# Patient Record
Sex: FEMALE | Race: WHITE | NOT HISPANIC OR LATINO
[De-identification: names, ages, dates, MRNs, and addresses within clinical notes are randomized per-mention and may not be internally consistent; named-entity substitution may affect disease eponyms.]

---

## 2017-01-08 ENCOUNTER — FORM ENCOUNTER (OUTPATIENT)
Age: 42
End: 2017-01-08

## 2017-01-09 ENCOUNTER — APPOINTMENT (OUTPATIENT)
Dept: ULTRASOUND IMAGING | Facility: CLINIC | Age: 42
End: 2017-01-09

## 2017-01-09 ENCOUNTER — APPOINTMENT (OUTPATIENT)
Dept: MAMMOGRAPHY | Facility: CLINIC | Age: 42
End: 2017-01-09

## 2017-01-09 ENCOUNTER — OUTPATIENT (OUTPATIENT)
Dept: OUTPATIENT SERVICES | Facility: HOSPITAL | Age: 42
LOS: 1 days | End: 2017-01-09
Payer: COMMERCIAL

## 2017-01-09 DIAGNOSIS — Z00.8 ENCOUNTER FOR OTHER GENERAL EXAMINATION: ICD-10-CM

## 2017-01-09 PROCEDURE — 76642 ULTRASOUND BREAST LIMITED: CPT

## 2017-01-09 PROCEDURE — 77065 DX MAMMO INCL CAD UNI: CPT

## 2017-02-03 ENCOUNTER — MESSAGE (OUTPATIENT)
Age: 42
End: 2017-02-03

## 2017-07-10 ENCOUNTER — FORM ENCOUNTER (OUTPATIENT)
Age: 42
End: 2017-07-10

## 2017-07-11 ENCOUNTER — OUTPATIENT (OUTPATIENT)
Dept: OUTPATIENT SERVICES | Facility: HOSPITAL | Age: 42
LOS: 1 days | End: 2017-07-11
Payer: COMMERCIAL

## 2017-07-11 ENCOUNTER — APPOINTMENT (OUTPATIENT)
Dept: ULTRASOUND IMAGING | Facility: CLINIC | Age: 42
End: 2017-07-11

## 2017-07-11 DIAGNOSIS — Z00.8 ENCOUNTER FOR OTHER GENERAL EXAMINATION: ICD-10-CM

## 2017-07-11 PROCEDURE — 76642 ULTRASOUND BREAST LIMITED: CPT

## 2018-01-17 ENCOUNTER — FORM ENCOUNTER (OUTPATIENT)
Age: 43
End: 2018-01-17

## 2018-01-18 ENCOUNTER — APPOINTMENT (OUTPATIENT)
Dept: MAMMOGRAPHY | Facility: CLINIC | Age: 43
End: 2018-01-18
Payer: COMMERCIAL

## 2018-01-18 ENCOUNTER — APPOINTMENT (OUTPATIENT)
Dept: ULTRASOUND IMAGING | Facility: CLINIC | Age: 43
End: 2018-01-18
Payer: COMMERCIAL

## 2018-01-18 ENCOUNTER — OUTPATIENT (OUTPATIENT)
Dept: OUTPATIENT SERVICES | Facility: HOSPITAL | Age: 43
LOS: 1 days | End: 2018-01-18
Payer: COMMERCIAL

## 2018-01-18 DIAGNOSIS — Z00.8 ENCOUNTER FOR OTHER GENERAL EXAMINATION: ICD-10-CM

## 2018-01-18 PROCEDURE — 77065 DX MAMMO INCL CAD UNI: CPT | Mod: 26,GG

## 2018-01-18 PROCEDURE — 77067 SCR MAMMO BI INCL CAD: CPT | Mod: 26,59

## 2018-01-18 PROCEDURE — 77065 DX MAMMO INCL CAD UNI: CPT

## 2018-01-18 PROCEDURE — 77063 BREAST TOMOSYNTHESIS BI: CPT | Mod: 26,59

## 2018-01-18 PROCEDURE — 76642 ULTRASOUND BREAST LIMITED: CPT

## 2018-01-18 PROCEDURE — 76642 ULTRASOUND BREAST LIMITED: CPT | Mod: 26,RT

## 2018-01-18 PROCEDURE — 77067 SCR MAMMO BI INCL CAD: CPT

## 2018-01-18 PROCEDURE — 77063 BREAST TOMOSYNTHESIS BI: CPT

## 2018-02-26 ENCOUNTER — APPOINTMENT (OUTPATIENT)
Dept: OBGYN | Facility: CLINIC | Age: 43
End: 2018-02-26
Payer: COMMERCIAL

## 2018-02-26 VITALS
SYSTOLIC BLOOD PRESSURE: 110 MMHG | WEIGHT: 140 LBS | HEIGHT: 65 IN | DIASTOLIC BLOOD PRESSURE: 70 MMHG | BODY MASS INDEX: 23.32 KG/M2

## 2018-02-26 DIAGNOSIS — Z01.419 ENCOUNTER FOR GYNECOLOGICAL EXAMINATION (GENERAL) (ROUTINE) W/OUT ABNORMAL FINDINGS: ICD-10-CM

## 2018-02-26 PROCEDURE — 82270 OCCULT BLOOD FECES: CPT

## 2018-02-26 PROCEDURE — 99396 PREV VISIT EST AGE 40-64: CPT

## 2018-03-01 LAB
CYTOLOGY CVX/VAG DOC THIN PREP: NORMAL
HPV HIGH+LOW RISK DNA PNL CVX: NOT DETECTED

## 2020-01-14 ENCOUNTER — FORM ENCOUNTER (OUTPATIENT)
Age: 45
End: 2020-01-14

## 2020-01-15 ENCOUNTER — OUTPATIENT (OUTPATIENT)
Dept: OUTPATIENT SERVICES | Facility: HOSPITAL | Age: 45
LOS: 1 days | End: 2020-01-15
Payer: COMMERCIAL

## 2020-01-15 ENCOUNTER — APPOINTMENT (OUTPATIENT)
Dept: ULTRASOUND IMAGING | Facility: CLINIC | Age: 45
End: 2020-01-15
Payer: COMMERCIAL

## 2020-01-15 ENCOUNTER — APPOINTMENT (OUTPATIENT)
Dept: MAMMOGRAPHY | Facility: CLINIC | Age: 45
End: 2020-01-15
Payer: COMMERCIAL

## 2020-01-15 DIAGNOSIS — Z01.419 ENCOUNTER FOR GYNECOLOGICAL EXAMINATION (GENERAL) (ROUTINE) WITHOUT ABNORMAL FINDINGS: ICD-10-CM

## 2020-01-15 PROCEDURE — 77067 SCR MAMMO BI INCL CAD: CPT | Mod: 26

## 2020-01-15 PROCEDURE — 77063 BREAST TOMOSYNTHESIS BI: CPT | Mod: 26

## 2020-01-15 PROCEDURE — 77067 SCR MAMMO BI INCL CAD: CPT

## 2020-01-15 PROCEDURE — 77063 BREAST TOMOSYNTHESIS BI: CPT

## 2020-01-15 PROCEDURE — 76641 ULTRASOUND BREAST COMPLETE: CPT | Mod: 26,50

## 2020-01-15 PROCEDURE — 76641 ULTRASOUND BREAST COMPLETE: CPT

## 2020-02-24 ENCOUNTER — APPOINTMENT (OUTPATIENT)
Dept: OBGYN | Facility: CLINIC | Age: 45
End: 2020-02-24
Payer: COMMERCIAL

## 2020-02-24 VITALS
DIASTOLIC BLOOD PRESSURE: 60 MMHG | BODY MASS INDEX: 23.32 KG/M2 | HEIGHT: 65 IN | WEIGHT: 140 LBS | SYSTOLIC BLOOD PRESSURE: 100 MMHG

## 2020-02-24 DIAGNOSIS — Z01.419 ENCOUNTER FOR GYNECOLOGICAL EXAMINATION (GENERAL) (ROUTINE) W/OUT ABNORMAL FINDINGS: ICD-10-CM

## 2020-02-24 PROCEDURE — 99396 PREV VISIT EST AGE 40-64: CPT

## 2020-02-24 PROCEDURE — 82270 OCCULT BLOOD FECES: CPT

## 2020-02-24 NOTE — PHYSICAL EXAM
[Alert] : alert [Awake] : awake [Acute Distress] : no acute distress [Mass] : no breast mass [Axillary LAD] : no axillary lymphadenopathy [Soft] : soft [Nipple Discharge] : no nipple discharge [Oriented x3] : oriented to person, place, and time [Tender] : non tender [Normal] : uterus [Pap Obtained] : a Pap smear was performed [No Bleeding] : there was no active vaginal bleeding [Uterine Adnexae] : were not tender and not enlarged [No Tenderness] : no rectal tenderness [Occult Blood] : occult blood test from digital rectal exam was negative [Nl Sphincter Tone] : normal sphincter tone

## 2020-02-24 NOTE — CHIEF COMPLAINT
[FreeTextEntry1] : Patient is a 44-year-old female presents for routine annual gynecologic examination. Patient has no complaints. Patient's last mammogram was one month ago [Annual Visit] : annual visit

## 2020-02-24 NOTE — PHYSICAL EXAM
[Awake] : awake [Alert] : alert [Acute Distress] : no acute distress [Mass] : no breast mass [Soft] : soft [Axillary LAD] : no axillary lymphadenopathy [Nipple Discharge] : no nipple discharge [Tender] : non tender [Oriented x3] : oriented to person, place, and time [Normal] : uterus [Pap Obtained] : a Pap smear was performed [No Bleeding] : there was no active vaginal bleeding [No Tenderness] : no rectal tenderness [Uterine Adnexae] : were not tender and not enlarged [Occult Blood] : occult blood test from digital rectal exam was negative [Nl Sphincter Tone] : normal sphincter tone

## 2020-02-25 LAB — HPV HIGH+LOW RISK DNA PNL CVX: NOT DETECTED

## 2021-04-08 ENCOUNTER — OUTPATIENT (OUTPATIENT)
Dept: OUTPATIENT SERVICES | Facility: HOSPITAL | Age: 46
LOS: 1 days | End: 2021-04-08
Payer: COMMERCIAL

## 2021-04-08 ENCOUNTER — RESULT REVIEW (OUTPATIENT)
Age: 46
End: 2021-04-08

## 2021-04-08 ENCOUNTER — APPOINTMENT (OUTPATIENT)
Dept: MAMMOGRAPHY | Facility: CLINIC | Age: 46
End: 2021-04-08
Payer: COMMERCIAL

## 2021-04-08 ENCOUNTER — APPOINTMENT (OUTPATIENT)
Dept: ULTRASOUND IMAGING | Facility: CLINIC | Age: 46
End: 2021-04-08
Payer: COMMERCIAL

## 2021-04-08 DIAGNOSIS — Z12.39 ENCOUNTER FOR OTHER SCREENING FOR MALIGNANT NEOPLASM OF BREAST: ICD-10-CM

## 2021-04-08 PROCEDURE — 77067 SCR MAMMO BI INCL CAD: CPT | Mod: 26

## 2021-04-08 PROCEDURE — 76641 ULTRASOUND BREAST COMPLETE: CPT | Mod: 26,50

## 2021-04-08 PROCEDURE — 77063 BREAST TOMOSYNTHESIS BI: CPT

## 2021-04-08 PROCEDURE — 77067 SCR MAMMO BI INCL CAD: CPT

## 2021-04-08 PROCEDURE — 77063 BREAST TOMOSYNTHESIS BI: CPT | Mod: 26

## 2021-04-08 PROCEDURE — 76641 ULTRASOUND BREAST COMPLETE: CPT

## 2021-06-09 ENCOUNTER — APPOINTMENT (OUTPATIENT)
Dept: OBGYN | Facility: CLINIC | Age: 46
End: 2021-06-09
Payer: COMMERCIAL

## 2021-06-09 VITALS
HEIGHT: 65 IN | DIASTOLIC BLOOD PRESSURE: 66 MMHG | WEIGHT: 140 LBS | SYSTOLIC BLOOD PRESSURE: 106 MMHG | HEART RATE: 70 BPM | BODY MASS INDEX: 23.32 KG/M2 | TEMPERATURE: 96.7 F | RESPIRATION RATE: 16 BRPM

## 2021-06-09 DIAGNOSIS — N95.1 MENOPAUSAL AND FEMALE CLIMACTERIC STATES: ICD-10-CM

## 2021-06-09 DIAGNOSIS — Z01.419 ENCOUNTER FOR GYNECOLOGICAL EXAMINATION (GENERAL) (ROUTINE) W/OUT ABNORMAL FINDINGS: ICD-10-CM

## 2021-06-09 PROCEDURE — 82270 OCCULT BLOOD FECES: CPT

## 2021-06-09 PROCEDURE — 99396 PREV VISIT EST AGE 40-64: CPT

## 2021-06-09 PROCEDURE — 99072 ADDL SUPL MATRL&STAF TM PHE: CPT

## 2021-06-09 NOTE — HISTORY OF PRESENT ILLNESS
[FreeTextEntry1] : Patient is a 46-year-old female presents for routine annual gynecologic examination. Patient states her cycles are becoming irregular last menstrual period March 2021. Patient has occasional vasomotor symptoms. Discussed perimenopausal patient in detail. Advised obtained FSH and TSH. Discuss natural supplements. Patient's last mammogram was April 2021.

## 2021-06-10 LAB — HPV HIGH+LOW RISK DNA PNL CVX: NOT DETECTED

## 2021-12-26 ENCOUNTER — TRANSCRIPTION ENCOUNTER (OUTPATIENT)
Age: 46
End: 2021-12-26

## 2021-12-28 ENCOUNTER — NON-APPOINTMENT (OUTPATIENT)
Age: 46
End: 2021-12-28

## 2021-12-29 ENCOUNTER — APPOINTMENT (OUTPATIENT)
Dept: INTERNAL MEDICINE | Facility: CLINIC | Age: 46
End: 2021-12-29
Payer: COMMERCIAL

## 2021-12-29 ENCOUNTER — TRANSCRIPTION ENCOUNTER (OUTPATIENT)
Age: 46
End: 2021-12-29

## 2021-12-29 VITALS — BODY MASS INDEX: 23.32 KG/M2 | WEIGHT: 140 LBS | HEIGHT: 65 IN

## 2021-12-29 DIAGNOSIS — Z78.9 OTHER SPECIFIED HEALTH STATUS: ICD-10-CM

## 2021-12-29 DIAGNOSIS — J30.2 OTHER SEASONAL ALLERGIC RHINITIS: ICD-10-CM

## 2021-12-29 DIAGNOSIS — Z12.39 ENCOUNTER FOR OTHER SCREENING FOR MALIGNANT NEOPLASM OF BREAST: ICD-10-CM

## 2021-12-29 PROCEDURE — 99203 OFFICE O/P NEW LOW 30 MIN: CPT | Mod: 95

## 2021-12-29 RX ORDER — MULTIVITAMIN
CAPSULE ORAL
Refills: 0 | Status: ACTIVE | COMMUNITY

## 2021-12-29 RX ORDER — ZINC SULFATE 50(220)MG
CAPSULE ORAL
Refills: 0 | Status: ACTIVE | COMMUNITY

## 2021-12-29 RX ORDER — ASCORBIC ACID 500 MG
TABLET ORAL
Refills: 0 | Status: ACTIVE | COMMUNITY

## 2021-12-29 NOTE — ASSESSMENT
[FreeTextEntry1] : \par 45 yo F pmhx seasonal allergies for new pt acute visit\par \par covid infection- dx'd +12/27/21 at  s/p exposure, feels is getting worse especially sore throat a/w trouble swallowing.  Nontoxic appearing.\par -Advised urgent care evaluation for further throat inspection to determine if further management needed\par -Advised symptom control with over-the-counter medications, Tylenol, throat lozenges and salt water gargles\par -Advised rest, increased hydration and bland diet\par -Advised home quarantine for 10 days, advised of need for both symptom improvement and being without a fever for 24 hours without use of fever reducing medication prior to discontinuation of quarantine\par -Advised prompt medical eval if chest pain or symptoms worsen or onset of shortness of breath\par -Advised to follow-up via virtual visit in 1 week\par \par MISC:  Continued social distancing and measure for covid19 prevention encouraged.  \par -hx Pfizer vaccine series -second dose in 5/21. Booster is pending 1/3/2022-advised to avoid until current symptoms completely resolved.\par \par HCM\par -Advised to follow-up in office once feeling well for CPE and review of health care maintenance\par -Advised seasonal flu shot once symptoms resolved\par -hx negative PAP/HPV 6/21 by GYN\par -hx negative mammo 4/21 by GYN\par \par Pt's cell: [phone]\par

## 2022-01-06 ENCOUNTER — APPOINTMENT (OUTPATIENT)
Dept: INTERNAL MEDICINE | Facility: CLINIC | Age: 47
End: 2022-01-06
Payer: SELF-PAY

## 2022-01-06 ENCOUNTER — APPOINTMENT (OUTPATIENT)
Dept: INTERNAL MEDICINE | Facility: CLINIC | Age: 47
End: 2022-01-06

## 2022-01-06 DIAGNOSIS — U07.1 COVID-19: ICD-10-CM

## 2022-01-06 DIAGNOSIS — J02.9 ACUTE PHARYNGITIS, UNSPECIFIED: ICD-10-CM

## 2022-01-06 PROCEDURE — 99442: CPT

## 2022-01-06 NOTE — ASSESSMENT
[FreeTextEntry1] : \par 45 yo F pmhx seasonal allergies for new pt acute visit\par \par covid infection- dx'd +12/27/21 at  s/p exposure, s/p  12/29 2/2 significant sore throat a/w trouble swallowing.  Now significantly improved with conservative tx.\par -12/21 rapid strep negative at  along with throat culture sent at time of  visit\par -Advised symptom control with over-the-counter medications, Tylenol, throat lozenges and salt water gargles\par -Advised rest, increased hydration and bland diet\par -Advised home quarantine for 10 days, advised of need for both symptom improvement and being without a fever for 24 hours without use of fever reducing medication prior to discontinuation of quarantine\par -Advised prompt medical eval if chest pain or symptoms worsen or onset of shortness of breath\par -Advised to follow-up in office once sx's resolved for CPE and cont'd medical care, to f/u sooner as needed\par \par MISC:  Continued social distancing and measure for covid19 prevention encouraged.  \par -hx Pfizer vaccine series -second dose in 5/21. Booster pending- advised to avoid until current symptoms completely resolved.\par \par HCM\par -Advised to follow-up in office once feeling well for CPE and review of health care maintenance\par -Advised seasonal flu shot once symptoms resolved\par -hx negative PAP/HPV 6/21 by GYN\par -hx negative mammo 4/21 by GYN\par \par Pt's cell: [phone]\par

## 2022-01-06 NOTE — HISTORY OF PRESENT ILLNESS
[FreeTextEntry1] : \par f/u [FreeTextEntry8] : \par As this is a telemedicine visit, no physical exam could be performed.  Diagnosis and treatment is based on symptoms provide.\par No video portion of visit done due to technical trouble patient was having.\par \par Seen as new pt via virtual visit 12/29/21 for covid positive status with  advised for significant throat pain a/w trouble swallowing.\par \par \par cc: f/u covid positive, throat pain\par \par hx tested covid positive on 12/27/21 at - done as feeling unwell since 12/27/21 and known to have exposure to Covid positive \par \par Reports feeling overall 80% better since last visit.\par Was seen at  12/29/21 for evaluation- had negative rapid strep test and culture sent (not sure of results).  States advised OTC treatment (Chloraseptic spray and Motrin prn)\par States no covid testing done then, but did own home test yesterday as day 10 of home quarantine and it was negative.\par \par Reports eating well w/o issues.\par Only residual sx's are min gen fatigue and minimal cough- notes both much improved from prior.\par \par Denies fever, checked temp during visit - states is 98.1 (notes last took Motrin yesterday)\par Denies CP, sob or GI complaints- states is having nl BMs.\par \par Reports is socially distancing and using precautions for covid prevention.  Is quarantining since Covid diagnosis.\par -hx Pfizer vaccine series -second dose in 5/21. \par -no flu shot yet this season\par \par Denies any significant past medical history except seasonal allergies.\par States does not take any prescribed medication.\par

## 2022-06-03 DIAGNOSIS — Z12.39 ENCOUNTER FOR OTHER SCREENING FOR MALIGNANT NEOPLASM OF BREAST: ICD-10-CM

## 2022-06-06 ENCOUNTER — APPOINTMENT (OUTPATIENT)
Dept: MAMMOGRAPHY | Facility: CLINIC | Age: 47
End: 2022-06-06
Payer: COMMERCIAL

## 2022-06-06 ENCOUNTER — RESULT REVIEW (OUTPATIENT)
Age: 47
End: 2022-06-06

## 2022-06-06 ENCOUNTER — OUTPATIENT (OUTPATIENT)
Dept: OUTPATIENT SERVICES | Facility: HOSPITAL | Age: 47
LOS: 1 days | End: 2022-06-06
Payer: COMMERCIAL

## 2022-06-06 ENCOUNTER — APPOINTMENT (OUTPATIENT)
Dept: ULTRASOUND IMAGING | Facility: CLINIC | Age: 47
End: 2022-06-06
Payer: COMMERCIAL

## 2022-06-06 DIAGNOSIS — Z12.39 ENCOUNTER FOR OTHER SCREENING FOR MALIGNANT NEOPLASM OF BREAST: ICD-10-CM

## 2022-06-06 PROCEDURE — 77067 SCR MAMMO BI INCL CAD: CPT | Mod: 26

## 2022-06-06 PROCEDURE — 77063 BREAST TOMOSYNTHESIS BI: CPT | Mod: 26

## 2022-06-06 PROCEDURE — 76641 ULTRASOUND BREAST COMPLETE: CPT | Mod: 26,50

## 2022-06-06 PROCEDURE — 76641 ULTRASOUND BREAST COMPLETE: CPT

## 2022-06-06 PROCEDURE — 77067 SCR MAMMO BI INCL CAD: CPT

## 2022-06-06 PROCEDURE — 77063 BREAST TOMOSYNTHESIS BI: CPT

## 2022-06-07 ENCOUNTER — TRANSCRIPTION ENCOUNTER (OUTPATIENT)
Age: 47
End: 2022-06-07

## 2022-07-13 ENCOUNTER — APPOINTMENT (OUTPATIENT)
Dept: OBGYN | Facility: CLINIC | Age: 47
End: 2022-07-13

## 2022-07-13 VITALS
SYSTOLIC BLOOD PRESSURE: 104 MMHG | WEIGHT: 143 LBS | BODY MASS INDEX: 23.82 KG/M2 | DIASTOLIC BLOOD PRESSURE: 70 MMHG | HEIGHT: 65 IN

## 2022-07-13 DIAGNOSIS — Z01.419 ENCOUNTER FOR GYNECOLOGICAL EXAMINATION (GENERAL) (ROUTINE) W/OUT ABNORMAL FINDINGS: ICD-10-CM

## 2022-07-13 DIAGNOSIS — N95.1 MENOPAUSAL AND FEMALE CLIMACTERIC STATES: ICD-10-CM

## 2022-07-13 PROCEDURE — 99396 PREV VISIT EST AGE 40-64: CPT

## 2022-07-13 PROCEDURE — 82270 OCCULT BLOOD FECES: CPT

## 2022-07-13 RX ORDER — ESTRADIOL 0.1 MG/G
0.1 CREAM VAGINAL
Qty: 1 | Refills: 3 | Status: ACTIVE | COMMUNITY
Start: 2022-07-13 | End: 1900-01-01

## 2022-07-13 NOTE — HISTORY OF PRESENT ILLNESS
[FreeTextEntry1] : Patient is a 47-year-old female presents for routine annual gynecologic examination.  Patient has complaints of vaginal dryness.  Patient's last menstrual period was January 2022.  Discussed perimenopause with patient recommend treatment with vitamin supplementation natural supplements and estradiol vaginal cream.  Instructions and precautions reviewed\par \par Last mammogram was June 2022

## 2022-07-15 LAB
CYTOLOGY CVX/VAG DOC THIN PREP: NORMAL
HPV HIGH+LOW RISK DNA PNL CVX: NOT DETECTED

## 2023-06-26 ENCOUNTER — RESULT REVIEW (OUTPATIENT)
Age: 48
End: 2023-06-26

## 2023-06-26 ENCOUNTER — APPOINTMENT (OUTPATIENT)
Dept: ULTRASOUND IMAGING | Facility: CLINIC | Age: 48
End: 2023-06-26

## 2023-06-26 ENCOUNTER — OUTPATIENT (OUTPATIENT)
Dept: OUTPATIENT SERVICES | Facility: HOSPITAL | Age: 48
LOS: 1 days | End: 2023-06-26
Payer: COMMERCIAL

## 2023-06-26 ENCOUNTER — APPOINTMENT (OUTPATIENT)
Dept: MAMMOGRAPHY | Facility: CLINIC | Age: 48
End: 2023-06-26
Payer: COMMERCIAL

## 2023-06-26 DIAGNOSIS — Z12.39 ENCOUNTER FOR OTHER SCREENING FOR MALIGNANT NEOPLASM OF BREAST: ICD-10-CM

## 2023-06-26 PROCEDURE — 77063 BREAST TOMOSYNTHESIS BI: CPT

## 2023-06-26 PROCEDURE — 77063 BREAST TOMOSYNTHESIS BI: CPT | Mod: 26

## 2023-06-26 PROCEDURE — 76641 ULTRASOUND BREAST COMPLETE: CPT | Mod: 26,50

## 2023-06-26 PROCEDURE — 77067 SCR MAMMO BI INCL CAD: CPT | Mod: 26

## 2023-06-26 PROCEDURE — 76641 ULTRASOUND BREAST COMPLETE: CPT

## 2023-06-26 PROCEDURE — 77067 SCR MAMMO BI INCL CAD: CPT

## 2023-07-24 ENCOUNTER — APPOINTMENT (OUTPATIENT)
Dept: OBGYN | Facility: CLINIC | Age: 48
End: 2023-07-24
Payer: COMMERCIAL

## 2023-07-24 VITALS
WEIGHT: 150 LBS | DIASTOLIC BLOOD PRESSURE: 70 MMHG | BODY MASS INDEX: 24.99 KG/M2 | RESPIRATION RATE: 16 BRPM | HEIGHT: 65 IN | SYSTOLIC BLOOD PRESSURE: 108 MMHG

## 2023-07-24 DIAGNOSIS — Z01.419 ENCOUNTER FOR GYNECOLOGICAL EXAMINATION (GENERAL) (ROUTINE) W/OUT ABNORMAL FINDINGS: ICD-10-CM

## 2023-07-24 DIAGNOSIS — Z12.12 ENCOUNTER FOR SCREENING FOR MALIGNANT NEOPLASM OF COLON: ICD-10-CM

## 2023-07-24 DIAGNOSIS — Z12.11 ENCOUNTER FOR SCREENING FOR MALIGNANT NEOPLASM OF COLON: ICD-10-CM

## 2023-07-24 DIAGNOSIS — N95.1 MENOPAUSAL AND FEMALE CLIMACTERIC STATES: ICD-10-CM

## 2023-07-24 DIAGNOSIS — N95.2 POSTMENOPAUSAL ATROPHIC VAGINITIS: ICD-10-CM

## 2023-07-24 DIAGNOSIS — R68.82 DECREASED LIBIDO: ICD-10-CM

## 2023-07-24 DIAGNOSIS — R23.2 FLUSHING: ICD-10-CM

## 2023-07-24 LAB
CARD LOT #: NORMAL
CARD LOT EXP DATE: NORMAL
DATE COLLECTED: NORMAL
DEVELOPER LOT #: NORMAL
DEVELOPER LOT EXP DATE: NORMAL
HEMOCCULT SP1 STL QL: NEGATIVE
QUALITY CONTROL: YES

## 2023-07-24 PROCEDURE — 99396 PREV VISIT EST AGE 40-64: CPT

## 2023-07-24 PROCEDURE — 82270 OCCULT BLOOD FECES: CPT

## 2023-07-24 NOTE — HISTORY OF PRESENT ILLNESS
[FreeTextEntry1] : Patient is a 48-year-old female who presents for a routine annual gynecologic examination.  Patient with complaints of significant problematic menopausal symptoms including vasomotor flushing and decreased libido.  Discussed this issue with patient advised hormonal lab evaluation and follow-up consultation to discuss test results and treatment options.  Patient is agreeable.  Patient using estradiol vaginal cream.  Patient's last mammogram was June 2023

## 2023-07-26 LAB — HPV HIGH+LOW RISK DNA PNL CVX: NOT DETECTED

## 2023-08-03 LAB — CYTOLOGY CVX/VAG DOC THIN PREP: ABNORMAL

## 2023-08-08 LAB
ALBUMIN SERPL ELPH-MCNC: 4.7 G/DL
ALP BLD-CCNC: 83 U/L
ALT SERPL-CCNC: 12 U/L
AST SERPL-CCNC: 15 U/L
BILIRUB DIRECT SERPL-MCNC: 0.2 MG/DL
BILIRUB INDIRECT SERPL-MCNC: 0.5 MG/DL
BILIRUB SERPL-MCNC: 0.6 MG/DL
ESTRADIOL SERPL-MCNC: 9 PG/ML
FSH SERPL-MCNC: 93.5 IU/L
PROT SERPL-MCNC: 6.4 G/DL
TSH SERPL-ACNC: 1.49 UIU/ML

## 2023-08-14 LAB
DHEA SERPL-MCNC: 126 NG/DL
DHEA-SULFATE, SERUM: 58 UG/DL
TESTOST FREE SERPL-MCNC: 0.6 PG/ML
TESTOST SERPL-MCNC: 2.8 NG/DL

## 2023-08-16 ENCOUNTER — APPOINTMENT (OUTPATIENT)
Dept: OBGYN | Facility: CLINIC | Age: 48
End: 2023-08-16
Payer: COMMERCIAL

## 2023-08-16 DIAGNOSIS — N95.1 MENOPAUSAL AND FEMALE CLIMACTERIC STATES: ICD-10-CM

## 2023-08-16 PROCEDURE — 99214 OFFICE O/P EST MOD 30 MIN: CPT

## 2023-08-16 NOTE — DISCUSSION/SUMMARY
[FreeTextEntry1] : Impression: Menopausal syndrome, hormone replacement therapy, decreased libido  Recommendations and discussion as detailed in the above consultation

## 2023-08-16 NOTE — HISTORY OF PRESENT ILLNESS
[FreeTextEntry1] : Patient is a 48-year-old female who presents for a follow-up consultation to discuss test results and treatment options.  Patient had complaints of significant menopausal symptoms and decreased libido.  Patient's hormonal lab evaluation reveals post menopause with low testosterone levels.  Discussed hormone replacement therapy with patient recommended bioidentical HRT.  Risk benefits and alternatives reviewed and questions answered.  Patient is amenable and would like to start therapy.  Will prescribe estrogen 1.5 mg/progesterone 30 mg/testosterone 3 mg cream daily.  Instructions and precautions reviewed.  Recommend follow-up in 3 months with repeat lab work.  30 minutes of face time

## 2023-11-08 LAB
ALBUMIN SERPL ELPH-MCNC: 4.6 G/DL
ALP BLD-CCNC: 81 U/L
ALT SERPL-CCNC: 10 U/L
AST SERPL-CCNC: 11 U/L
BILIRUB DIRECT SERPL-MCNC: 0.1 MG/DL
BILIRUB INDIRECT SERPL-MCNC: 0.4 MG/DL
BILIRUB SERPL-MCNC: 0.5 MG/DL
PROT SERPL-MCNC: 6.6 G/DL

## 2023-11-09 LAB
TESTOST FREE SERPL-MCNC: 0.5 PG/ML
TESTOST SERPL-MCNC: 17.3 NG/DL

## 2023-11-15 ENCOUNTER — APPOINTMENT (OUTPATIENT)
Dept: OBGYN | Facility: CLINIC | Age: 48
End: 2023-11-15
Payer: COMMERCIAL

## 2023-11-15 VITALS
SYSTOLIC BLOOD PRESSURE: 110 MMHG | WEIGHT: 150 LBS | BODY MASS INDEX: 24.99 KG/M2 | DIASTOLIC BLOOD PRESSURE: 68 MMHG | HEIGHT: 65 IN

## 2023-11-15 DIAGNOSIS — Z79.890 HORMONE REPLACEMENT THERAPY: ICD-10-CM

## 2023-11-15 DIAGNOSIS — Z92.29 PERSONAL HISTORY OF OTHER DRUG THERAPY: ICD-10-CM

## 2023-11-15 DIAGNOSIS — R68.82 DECREASED LIBIDO: ICD-10-CM

## 2023-11-15 PROCEDURE — 99213 OFFICE O/P EST LOW 20 MIN: CPT

## 2023-11-15 RX ORDER — TESTOSTERONE MICRONIZED 100 %
POWDER (GRAM) MISCELLANEOUS
Qty: 100 | Refills: 0 | Status: ACTIVE | COMMUNITY
Start: 2023-11-15 | End: 1900-01-01

## 2024-02-21 RX ORDER — ESTRADIOL 0.1 MG/G
0.1 CREAM VAGINAL
Qty: 1 | Refills: 1 | Status: ACTIVE | COMMUNITY
Start: 2023-07-24 | End: 1900-01-01

## 2024-03-20 ENCOUNTER — APPOINTMENT (OUTPATIENT)
Dept: OBGYN | Facility: CLINIC | Age: 49
End: 2024-03-20
Payer: COMMERCIAL

## 2024-03-20 VITALS
WEIGHT: 150 LBS | DIASTOLIC BLOOD PRESSURE: 68 MMHG | SYSTOLIC BLOOD PRESSURE: 110 MMHG | HEIGHT: 65 IN | BODY MASS INDEX: 24.99 KG/M2

## 2024-03-20 DIAGNOSIS — N95.1 MENOPAUSAL AND FEMALE CLIMACTERIC STATES: ICD-10-CM

## 2024-03-20 DIAGNOSIS — R68.82 DECREASED LIBIDO: ICD-10-CM

## 2024-03-20 PROCEDURE — 99214 OFFICE O/P EST MOD 30 MIN: CPT

## 2024-03-20 RX ORDER — TESTOSTERONE MICRONIZED 100 %
POWDER (GRAM) MISCELLANEOUS
Qty: 1 | Refills: 0 | Status: ACTIVE | COMMUNITY
Start: 2024-03-20 | End: 1900-01-01

## 2024-03-20 NOTE — HISTORY OF PRESENT ILLNESS
[FreeTextEntry1] : Patient is a 48-year-old female who presents for 3-month follow-up on bioidentical HRT with change in dosage to 1.75 mg estrogen/40 mg progesterone/4 mg testosterone cream.  Patient states he is slightly improved but still has issues in terms of feeling well.  Discussed these issues with patient.  Patient advised to consider changing to oral therapy with testosterone cream.  Risk benefits and alternatives reviewed and questions answered.  Patient will consider.  Patient will stay on the current regimen at this time.  Patient advised to have a mammogram in June 2024.  Discussed other wellness treatments with patient including yoga and meditation.  Patient states she currently exercises regularly and watches her diet.  Instructions and precautions reviewed follow-up in July 2024 for annual exam and Pap smear.  30 minutes of face time

## 2024-03-20 NOTE — DISCUSSION/SUMMARY
[FreeTextEntry1] : Impression: Menopausal syndrome, hormone replacement therapy, decreased libido  Continue estrogen 1.75 mg/progesterone 40 mg/testosterone 4 mg cream daily.  Recommendations and discussion as noted above dictated narrative  Follow-up in July 2024

## 2024-06-27 ENCOUNTER — APPOINTMENT (OUTPATIENT)
Dept: MAMMOGRAPHY | Facility: CLINIC | Age: 49
End: 2024-06-27
Payer: COMMERCIAL

## 2024-06-27 ENCOUNTER — APPOINTMENT (OUTPATIENT)
Dept: ULTRASOUND IMAGING | Facility: CLINIC | Age: 49
End: 2024-06-27
Payer: COMMERCIAL

## 2024-06-27 ENCOUNTER — RESULT REVIEW (OUTPATIENT)
Age: 49
End: 2024-06-27

## 2024-06-27 ENCOUNTER — TRANSCRIPTION ENCOUNTER (OUTPATIENT)
Age: 49
End: 2024-06-27

## 2024-06-27 ENCOUNTER — OUTPATIENT (OUTPATIENT)
Dept: OUTPATIENT SERVICES | Facility: HOSPITAL | Age: 49
LOS: 1 days | End: 2024-06-27
Payer: COMMERCIAL

## 2024-06-27 DIAGNOSIS — N63.0 UNSPECIFIED LUMP IN UNSPECIFIED BREAST: ICD-10-CM

## 2024-06-27 DIAGNOSIS — R92.30 DENSE BREASTS, UNSPECIFIED: ICD-10-CM

## 2024-06-27 DIAGNOSIS — R92.8 OTHER ABNORMAL AND INCONCLUSIVE FINDINGS ON DIAGNOSTIC IMAGING OF BREAST: ICD-10-CM

## 2024-06-27 DIAGNOSIS — Z00.8 ENCOUNTER FOR OTHER GENERAL EXAMINATION: ICD-10-CM

## 2024-06-27 PROCEDURE — 76641 ULTRASOUND BREAST COMPLETE: CPT

## 2024-06-27 PROCEDURE — 77067 SCR MAMMO BI INCL CAD: CPT

## 2024-06-27 PROCEDURE — 77063 BREAST TOMOSYNTHESIS BI: CPT | Mod: 26

## 2024-06-27 PROCEDURE — 77067 SCR MAMMO BI INCL CAD: CPT | Mod: 26

## 2024-06-27 PROCEDURE — 77063 BREAST TOMOSYNTHESIS BI: CPT

## 2024-06-27 PROCEDURE — 76641 ULTRASOUND BREAST COMPLETE: CPT | Mod: 26,50

## 2024-07-03 ENCOUNTER — RESULT REVIEW (OUTPATIENT)
Age: 49
End: 2024-07-03

## 2024-07-03 ENCOUNTER — APPOINTMENT (OUTPATIENT)
Dept: ULTRASOUND IMAGING | Facility: CLINIC | Age: 49
End: 2024-07-03
Payer: COMMERCIAL

## 2024-07-03 ENCOUNTER — OUTPATIENT (OUTPATIENT)
Dept: OUTPATIENT SERVICES | Facility: HOSPITAL | Age: 49
LOS: 1 days | End: 2024-07-03
Payer: COMMERCIAL

## 2024-07-03 DIAGNOSIS — R92.8 OTHER ABNORMAL AND INCONCLUSIVE FINDINGS ON DIAGNOSTIC IMAGING OF BREAST: ICD-10-CM

## 2024-07-03 PROCEDURE — 76642 ULTRASOUND BREAST LIMITED: CPT | Mod: 26,LT

## 2024-07-03 PROCEDURE — 76642 ULTRASOUND BREAST LIMITED: CPT

## 2024-07-29 ENCOUNTER — APPOINTMENT (OUTPATIENT)
Dept: OBGYN | Facility: CLINIC | Age: 49
End: 2024-07-29
Payer: COMMERCIAL

## 2024-07-29 VITALS
WEIGHT: 150 LBS | BODY MASS INDEX: 24.99 KG/M2 | DIASTOLIC BLOOD PRESSURE: 70 MMHG | HEIGHT: 65 IN | SYSTOLIC BLOOD PRESSURE: 114 MMHG

## 2024-07-29 DIAGNOSIS — N95.1 MENOPAUSAL AND FEMALE CLIMACTERIC STATES: ICD-10-CM

## 2024-07-29 DIAGNOSIS — Z92.29 PERSONAL HISTORY OF OTHER DRUG THERAPY: ICD-10-CM

## 2024-07-29 DIAGNOSIS — Z79.890 HORMONE REPLACEMENT THERAPY: ICD-10-CM

## 2024-07-29 DIAGNOSIS — G47.00 INSOMNIA, UNSPECIFIED: ICD-10-CM

## 2024-07-29 DIAGNOSIS — Z01.419 ENCOUNTER FOR GYNECOLOGICAL EXAMINATION (GENERAL) (ROUTINE) W/OUT ABNORMAL FINDINGS: ICD-10-CM

## 2024-07-29 PROCEDURE — 99396 PREV VISIT EST AGE 40-64: CPT

## 2024-07-29 PROCEDURE — 82270 OCCULT BLOOD FECES: CPT

## 2024-07-29 RX ORDER — ESTRADIOL 0.1 MG/G
0.1 CREAM VAGINAL
Qty: 1 | Refills: 3 | Status: ACTIVE | COMMUNITY
Start: 2024-07-29 | End: 1900-01-01

## 2024-07-29 RX ORDER — TESTOSTERONE MICRONIZED 100 %
POWDER (GRAM) MISCELLANEOUS
Qty: 1 | Refills: 0 | Status: ACTIVE | COMMUNITY
Start: 2024-07-29 | End: 1900-01-01

## 2024-07-29 RX ORDER — PROGESTERONE 100 MG/1
100 CAPSULE ORAL
Qty: 90 | Refills: 1 | Status: ACTIVE | COMMUNITY
Start: 2024-07-29 | End: 1900-01-01

## 2024-07-29 NOTE — PHYSICAL EXAM
[Appropriately responsive] : appropriately responsive [Alert] : alert [No Acute Distress] : no acute distress [No Lymphadenopathy] : no lymphadenopathy [Regular Rate Rhythm] : regular rate rhythm [No Murmurs] : no murmurs [Clear to Auscultation B/L] : clear to auscultation bilaterally [Soft] : soft [Non-tender] : non-tender [Non-distended] : non-distended [No HSM] : No HSM [No Lesions] : no lesions [No Mass] : no mass [Oriented x3] : oriented x3 [Examination Of The Breasts] : a normal appearance [No Masses] : no breast masses were palpable [Vulvar Atrophy] : vulvar atrophy [Labia Majora] : normal [Labia Minora] : normal [Atrophy] : atrophy [Normal] : normal [Uterine Adnexae] : normal [No Tenderness] : no tenderness [Nl Sphincter Tone] : normal sphincter tone [FreeTextEntry4] : Mild atrophy [FreeTextEntry5] : Pap done [FreeTextEntry9] : Hemoccult negative

## 2024-07-29 NOTE — DISCUSSION/SUMMARY
[FreeTextEntry1] : Impression: Menopausal syndrome, hormone replacement therapy, insomnia, vulvovaginal atrophy, otherwise normal GYN exam  Continue bioidentical HRT estrogen 1.75 mg/progesterone 40 mg/testosterone 4 mg cream daily and progesterone 25 mg capsule p.o. at bedtime and estradiol vaginal cream as directed  Recommendations: Self breast exam, mammography annually, bone density DEXA, calcium and vitamin D supplementation regular exercise  Follow-up in 6 months

## 2024-07-29 NOTE — HISTORY OF PRESENT ILLNESS
[FreeTextEntry1] : Patient is a 49-year-old female who presents for a routine annual gynecologic examination and bioidentical HRT surveillance and maintenance visit.  Patient is on estrogen 1.75 mg/progesterone 40 mg/testosterone 4 mg cream daily.  Patient states she is doing well with the exception of insomnia.  Discussed this issue with patient advised progesterone 25 mg capsule p.o. at bedtime and observe.  Patient also states that she has been having some anxiety over the past month and was advised to use CBD's by her PCP.  Patient's last mammogram was June 2024.  Patient advised to have a bone density DEXA test done.

## 2024-07-30 LAB — HPV HIGH+LOW RISK DNA PNL CVX: NOT DETECTED

## 2024-08-03 LAB — CYTOLOGY CVX/VAG DOC THIN PREP: ABNORMAL

## 2025-01-13 ENCOUNTER — APPOINTMENT (OUTPATIENT)
Dept: OBGYN | Facility: CLINIC | Age: 50
End: 2025-01-13
Payer: COMMERCIAL

## 2025-01-13 VITALS
SYSTOLIC BLOOD PRESSURE: 108 MMHG | BODY MASS INDEX: 24.99 KG/M2 | HEIGHT: 65 IN | WEIGHT: 150 LBS | DIASTOLIC BLOOD PRESSURE: 62 MMHG

## 2025-01-13 DIAGNOSIS — N95.1 MENOPAUSAL AND FEMALE CLIMACTERIC STATES: ICD-10-CM

## 2025-01-13 DIAGNOSIS — Z92.29 PERSONAL HISTORY OF OTHER DRUG THERAPY: ICD-10-CM

## 2025-01-13 DIAGNOSIS — Z79.890 HORMONE REPLACEMENT THERAPY: ICD-10-CM

## 2025-01-13 PROCEDURE — 99214 OFFICE O/P EST MOD 30 MIN: CPT

## 2025-01-13 RX ORDER — TESTOSTERONE MICRONIZED 100 %
POWDER (GRAM) MISCELLANEOUS
Qty: 1 | Refills: 0 | Status: ACTIVE | COMMUNITY
Start: 2025-01-13 | End: 1900-01-01

## 2025-01-13 RX ORDER — PROGESTERONE, MICRONIZED 100 %
POWDER (GRAM) MISCELLANEOUS
Qty: 60 | Refills: 1 | Status: ACTIVE | COMMUNITY
Start: 2025-01-13 | End: 1900-01-01

## 2025-05-02 ENCOUNTER — TRANSCRIPTION ENCOUNTER (OUTPATIENT)
Age: 50
End: 2025-05-02

## 2025-05-02 LAB
TESTOST FREE SERPL-MCNC: 0.6 PG/ML
TESTOST SERPL-MCNC: 24.7 NG/DL

## 2025-07-18 ENCOUNTER — APPOINTMENT (OUTPATIENT)
Dept: MAMMOGRAPHY | Facility: CLINIC | Age: 50
End: 2025-07-18
Payer: COMMERCIAL

## 2025-07-18 ENCOUNTER — RESULT REVIEW (OUTPATIENT)
Age: 50
End: 2025-07-18

## 2025-07-18 ENCOUNTER — APPOINTMENT (OUTPATIENT)
Dept: RADIOLOGY | Facility: CLINIC | Age: 50
End: 2025-07-18
Payer: COMMERCIAL

## 2025-07-18 ENCOUNTER — OUTPATIENT (OUTPATIENT)
Dept: OUTPATIENT SERVICES | Facility: HOSPITAL | Age: 50
LOS: 1 days | End: 2025-07-18
Payer: COMMERCIAL

## 2025-07-18 ENCOUNTER — APPOINTMENT (OUTPATIENT)
Dept: ULTRASOUND IMAGING | Facility: CLINIC | Age: 50
End: 2025-07-18
Payer: COMMERCIAL

## 2025-07-18 DIAGNOSIS — N95.1 MENOPAUSAL AND FEMALE CLIMACTERIC STATES: ICD-10-CM

## 2025-07-18 DIAGNOSIS — Z92.29 PERSONAL HISTORY OF OTHER DRUG THERAPY: ICD-10-CM

## 2025-07-18 PROCEDURE — 77085 DXA BONE DENSITY AXL VRT FX: CPT | Mod: 26

## 2025-07-18 PROCEDURE — 76641 ULTRASOUND BREAST COMPLETE: CPT | Mod: 26,50

## 2025-07-18 PROCEDURE — 77067 SCR MAMMO BI INCL CAD: CPT | Mod: 26

## 2025-07-18 PROCEDURE — 77063 BREAST TOMOSYNTHESIS BI: CPT

## 2025-07-18 PROCEDURE — 77067 SCR MAMMO BI INCL CAD: CPT

## 2025-07-18 PROCEDURE — 77063 BREAST TOMOSYNTHESIS BI: CPT | Mod: 26

## 2025-07-18 PROCEDURE — 76641 ULTRASOUND BREAST COMPLETE: CPT

## 2025-07-18 PROCEDURE — 77085 DXA BONE DENSITY AXL VRT FX: CPT

## 2025-07-21 ENCOUNTER — TRANSCRIPTION ENCOUNTER (OUTPATIENT)
Age: 50
End: 2025-07-21

## 2025-08-11 ENCOUNTER — NON-APPOINTMENT (OUTPATIENT)
Age: 50
End: 2025-08-11

## 2025-08-11 ENCOUNTER — APPOINTMENT (OUTPATIENT)
Dept: OBGYN | Facility: CLINIC | Age: 50
End: 2025-08-11
Payer: COMMERCIAL

## 2025-08-11 VITALS
DIASTOLIC BLOOD PRESSURE: 60 MMHG | HEIGHT: 65 IN | BODY MASS INDEX: 24.99 KG/M2 | WEIGHT: 150 LBS | SYSTOLIC BLOOD PRESSURE: 124 MMHG

## 2025-08-11 DIAGNOSIS — Z01.419 ENCOUNTER FOR GYNECOLOGICAL EXAMINATION (GENERAL) (ROUTINE) W/OUT ABNORMAL FINDINGS: ICD-10-CM

## 2025-08-11 DIAGNOSIS — N95.1 MENOPAUSAL AND FEMALE CLIMACTERIC STATES: ICD-10-CM

## 2025-08-11 DIAGNOSIS — N95.2 POSTMENOPAUSAL ATROPHIC VAGINITIS: ICD-10-CM

## 2025-08-11 DIAGNOSIS — Z92.29 PERSONAL HISTORY OF OTHER DRUG THERAPY: ICD-10-CM

## 2025-08-11 DIAGNOSIS — Z79.890 HORMONE REPLACEMENT THERAPY: ICD-10-CM

## 2025-08-11 DIAGNOSIS — R68.82 DECREASED LIBIDO: ICD-10-CM

## 2025-08-11 PROCEDURE — 82270 OCCULT BLOOD FECES: CPT

## 2025-08-11 PROCEDURE — 99386 PREV VISIT NEW AGE 40-64: CPT

## 2025-08-11 RX ORDER — ESTRADIOL 0.1 MG/G
0.1 CREAM VAGINAL
Qty: 1 | Refills: 3 | Status: ACTIVE | COMMUNITY
Start: 2025-08-11 | End: 1900-01-01

## 2025-08-11 RX ORDER — PROGESTERONE 100 MG/1
100 CAPSULE ORAL
Qty: 90 | Refills: 1 | Status: ACTIVE | COMMUNITY
Start: 2025-08-11 | End: 1900-01-01

## 2025-08-13 ENCOUNTER — RESULT CHARGE (OUTPATIENT)
Age: 50
End: 2025-08-13

## 2025-08-13 LAB
CARD LOT #: NORMAL
CARD LOT EXP DATE: NORMAL
CYTOLOGY CVX/VAG DOC THIN PREP: ABNORMAL
DATE COLLECTED: NORMAL
DEVELOPER LOT #: NORMAL
DEVELOPER LOT EXP DATE: NORMAL
HEMOCCULT SP1 STL QL: NEGATIVE
HPV HIGH+LOW RISK DNA PNL CVX: NOT DETECTED
QUALITY CONTROL: YES